# Patient Record
Sex: FEMALE | Race: WHITE | Employment: FULL TIME | URBAN - METROPOLITAN AREA
[De-identification: names, ages, dates, MRNs, and addresses within clinical notes are randomized per-mention and may not be internally consistent; named-entity substitution may affect disease eponyms.]

---

## 2021-06-10 ENCOUNTER — HOSPITAL ENCOUNTER (EMERGENCY)
Age: 26
Discharge: HOME | End: 2021-06-10
Attending: EMERGENCY MEDICINE
Payer: MEDICAID

## 2021-06-10 ENCOUNTER — APPOINTMENT (OUTPATIENT)
Dept: CT IMAGING | Age: 26
End: 2021-06-10
Attending: EMERGENCY MEDICINE
Payer: MEDICAID

## 2021-06-10 VITALS
SYSTOLIC BLOOD PRESSURE: 130 MMHG | DIASTOLIC BLOOD PRESSURE: 89 MMHG | RESPIRATION RATE: 16 BRPM | HEIGHT: 62 IN | HEART RATE: 95 BPM | WEIGHT: 166 LBS | BODY MASS INDEX: 30.55 KG/M2 | TEMPERATURE: 98 F | OXYGEN SATURATION: 99 %

## 2021-06-10 DIAGNOSIS — H81.02 MENIERE'S DISEASE OF LEFT EAR: Primary | ICD-10-CM

## 2021-06-10 PROCEDURE — 99284 EMERGENCY DEPT VISIT MOD MDM: CPT

## 2021-06-10 PROCEDURE — 70450 CT HEAD/BRAIN W/O DYE: CPT | Performed by: EMERGENCY MEDICINE

## 2021-06-10 PROCEDURE — 81025 URINE PREGNANCY TEST: CPT

## 2021-06-10 PROCEDURE — 70481 CT ORBIT/EAR/FOSSA W/DYE: CPT | Performed by: EMERGENCY MEDICINE

## 2021-06-10 PROCEDURE — 93005 ELECTROCARDIOGRAM TRACING: CPT

## 2021-06-10 PROCEDURE — 93010 ELECTROCARDIOGRAM REPORT: CPT

## 2021-06-10 RX ORDER — DIAZEPAM 2 MG/1
2 TABLET ORAL 3 TIMES DAILY PRN
Qty: 20 TABLET | Refills: 0 | Status: SHIPPED | OUTPATIENT
Start: 2021-06-10 | End: 2021-06-17

## 2021-06-10 RX ORDER — HYDROCHLOROTHIAZIDE 25 MG/1
25 TABLET ORAL DAILY
COMMUNITY

## 2021-06-10 RX ORDER — DIAZEPAM 5 MG/1
5 TABLET ORAL ONCE
Status: COMPLETED | OUTPATIENT
Start: 2021-06-10 | End: 2021-06-10

## 2021-06-10 RX ORDER — QUETIAPINE 100 MG/1
100 TABLET, FILM COATED ORAL NIGHTLY
COMMUNITY

## 2021-06-10 RX ORDER — MECLIZINE HCL 12.5 MG/1
12.5 TABLET ORAL 3 TIMES DAILY PRN
COMMUNITY

## 2021-06-10 NOTE — ED PROVIDER NOTES
Patient Seen in: [facility] In [place]      History   Patient presents with:  Dizziness  Nausea/Vomiting/Diarrhea: nausea only    Stated Complaint: dizziness for about a month.     HPI/Subjective:   HPI    Patient is a 44-year-old fema Physical Exam    GENERAL: No acute distress, Well appearing and non-toxic, Alert and oriented X 3   HEENT: Normocephalic, atraumatic. Moist mucous membranes.   Pupils equal round reactive to light accommodation, extraocular motion is intact, sclera midline shift. There are no intraparenchymal brain abnormalities. There is nothing specific for acute infarct. There is no hemorrhage or mass lesion. SINUSES:           There is a small mucous retention cyst in the left maxillary sinus.  MASTOIDS: OSSICLES:  No bony erosion. WINDOWS:  No visible narrowing. MIDDLE EAR:  No abnormal soft tissue or fluid. MASTOID:  No fluid, sclerosis, or soft tissue abnormality. INNER EAR:  No dehiscence or labyrinthine ossificans.   FACIAL CANAL:  No dehiscence or symptoms. I discussed the case with the patient and they had no questions, complaints, or concerns. Patient felt comfortable going home.                              Disposition and Plan     Clinical Impression:  Meniere's disease of left ear  (primary en

## 2021-06-10 NOTE — ED INITIAL ASSESSMENT (HPI)
Pt c/o dizziness that started 1 month ago. Pt was DX with vertigo. Pt states her symptoms have not improved.  Pt has referral for ENT and was rx meclizine

## (undated) NOTE — LETTER
Vanita 10, 2021    Patient: Mendel Cook   Date of Visit: 6/10/2021       To Whom It May Concern:    Mendel Cook was seen and treated in our emergency department on 6/10/2021. She should not return to work until 6/17/21.     If you have any quest